# Patient Record
Sex: MALE | Race: WHITE | NOT HISPANIC OR LATINO | Employment: FULL TIME | URBAN - METROPOLITAN AREA
[De-identification: names, ages, dates, MRNs, and addresses within clinical notes are randomized per-mention and may not be internally consistent; named-entity substitution may affect disease eponyms.]

---

## 2025-02-21 ENCOUNTER — OFFICE VISIT (OUTPATIENT)
Dept: URGENT CARE | Facility: CLINIC | Age: 55
End: 2025-02-21
Payer: OTHER GOVERNMENT

## 2025-02-21 VITALS
DIASTOLIC BLOOD PRESSURE: 60 MMHG | HEART RATE: 73 BPM | TEMPERATURE: 98 F | HEIGHT: 70 IN | OXYGEN SATURATION: 96 % | WEIGHT: 167.6 LBS | SYSTOLIC BLOOD PRESSURE: 95 MMHG | BODY MASS INDEX: 23.99 KG/M2

## 2025-02-21 DIAGNOSIS — U07.1 COVID-19: Primary | ICD-10-CM

## 2025-02-21 NOTE — PROGRESS NOTES
"Subjective   Patient ID: Addi Moody is a 54 y.o. male.    HPI    Patient is here because he tested positive for COVID yesterday on at at-home rapid test. He does need a note for work since he was out of work all week. States that his Sx started 2/16/25 in the evening, started as a headache and then went into his sinuses and then he started with body aches. He has been using a lot of nasal spray, DayQuil, Advil with little relief. States that now, he is \"still feeling weak,\" but feels better than he did at the beginning of the week. No continued fever. Denies dizziness, CP, SOB, abdominal pain, N/V/D, rash, swelling, bruising, ear pain, sore throat.     Review of Systems   All other systems reviewed and are negative.      Objective     Physical Exam  Vitals reviewed.   Constitutional:       General: He is awake.      Appearance: Normal appearance. He is well-developed.   HENT:      Head: Normocephalic and atraumatic.      Right Ear: Tympanic membrane and ear canal normal.      Left Ear: Tympanic membrane and ear canal normal.      Nose: Congestion present.      Mouth/Throat:      Lips: Pink.      Mouth: Mucous membranes are moist.      Pharynx: Oropharynx is clear.   Cardiovascular:      Rate and Rhythm: Normal rate and regular rhythm.   Pulmonary:      Effort: Pulmonary effort is normal.      Breath sounds: Normal breath sounds.   Musculoskeletal:      Cervical back: Full passive range of motion without pain.      Right lower leg: No edema.      Left lower leg: No edema.   Skin:     General: Skin is warm and dry.      Findings: No lesion or rash.   Neurological:      General: No focal deficit present.      Mental Status: He is alert and oriented to person, place, and time.      Cranial Nerves: No facial asymmetry.      Motor: Motor function is intact.      Gait: Gait is intact.   Psychiatric:         Attention and Perception: Attention normal.         Mood and Affect: Mood and affect normal. "       Assessment/Plan   Problem List Items Addressed This Visit    None  Visit Diagnoses         Codes    COVID-19    -  Primary U07.1          Overall normal exam, normal vitals  Note written  Continue supportive measures  ER if worsening Sx    Red flag symptoms reviewed with patient and all questions answered. Patient or parent/guardian verbalized understanding and agreement with care plan as above. All in office testing reviewed with patient. If symptoms worsen or do not improve, patient is to follow up with PCP or report to the ER.    Patient disposition: Home

## 2025-02-21 NOTE — LETTER
February 21, 2025     Patient: Addi Moody   YOB: 1970   Date of Visit: 2/21/2025       To Whom It May Concern:    Addi Moody was seen in my clinic on 2/21/2025 at 10:55 am. Please excuse Addi for his absence from work on this day to make the appointment. He should be excused 2/16/25-2/21/25 for positive COVID. He may return 2/24/25.     If you have any questions or concerns, please don't hesitate to call.         Sincerely,         Bhavna Partida PA-C        CC: No Recipients